# Patient Record
Sex: FEMALE | Race: OTHER | NOT HISPANIC OR LATINO | URBAN - METROPOLITAN AREA
[De-identification: names, ages, dates, MRNs, and addresses within clinical notes are randomized per-mention and may not be internally consistent; named-entity substitution may affect disease eponyms.]

---

## 2023-07-10 ENCOUNTER — EMERGENCY (EMERGENCY)
Facility: HOSPITAL | Age: 48
LOS: 1 days | Discharge: HOME | End: 2023-07-10
Attending: EMERGENCY MEDICINE
Payer: MEDICAID

## 2023-07-10 VITALS
TEMPERATURE: 98 F | SYSTOLIC BLOOD PRESSURE: 137 MMHG | OXYGEN SATURATION: 95 % | WEIGHT: 224.87 LBS | DIASTOLIC BLOOD PRESSURE: 86 MMHG | HEART RATE: 105 BPM | RESPIRATION RATE: 20 BRPM

## 2023-07-10 PROCEDURE — 99284 EMERGENCY DEPT VISIT MOD MDM: CPT

## 2023-07-10 PROCEDURE — 99283 EMERGENCY DEPT VISIT LOW MDM: CPT | Mod: 25

## 2023-07-10 PROCEDURE — 73030 X-RAY EXAM OF SHOULDER: CPT

## 2023-07-10 PROCEDURE — 73030 X-RAY EXAM OF SHOULDER: CPT | Mod: 26,RT

## 2023-07-10 NOTE — ED ADULT NURSE NOTE - CHIEF COMPLAINT QUOTE
Pt was able to be rescheduled as a VV for same day//dp  
R shoulder pain x 2 days, s/p fall 2 days ago

## 2023-07-10 NOTE — ED PROVIDER NOTE - CLINICAL SUMMARY MEDICAL DECISION MAKING FREE TEXT BOX
48-year-old female presents status post mechanical fall and right shoulder injury 3 days ago.  Well-appearing, vital signs stable, afebrile.  No focal neurodeficit on exam.  X-ray shows no signs of fracture or dislocation.  Symptoms may be related to ligamentous injury.  Sling applied and patient will need to follow-up with orthopedist outpatient within 1 week.

## 2023-07-10 NOTE — ED PROVIDER NOTE - NS ED ATTENDING STATEMENT MOD
This was a shared visit with the RICHIE. I reviewed and verified the documentation and independently performed the documented:

## 2023-07-10 NOTE — ED PROVIDER NOTE - PATIENT PORTAL LINK FT
You can access the FollowMyHealth Patient Portal offered by [facility] by registering at the following website: [email or web address]. By joining [employer]’s FollowMyHealth portal, you will also be able to view your health information using other applications (apps) compatible with our system.

## 2023-07-10 NOTE — ED PROVIDER NOTE - CARE PROVIDER_API CALL
Mele Ambrose.  Orthopaedic Surgery  [address], [address] [place] [address]  Phone: [phone]  Fax: [phone]  Follow Up Time:

## 2023-07-10 NOTE — ED PROVIDER NOTE - NSFOLLOWUPINSTRUCTIONS_ED_ALL_ED_FT
Follow up with the orthopedist within 1 week.  For pain you can take over the counter Ibuprofen 600 mg orally every 6 hours as needed for pain. Take medication with food.   If you experience any new or worsening symptoms or if you are concerned you can always come back to the emergency for a re-evaluation.  Some results may not be available at the time of your discharge from the hospital. You can download the FOLLOW MY HEALTH jayson and have access to these results.  If there were any prescriptions given to you during the visit today take them as prescribed. If you have any questions you can ask the pharmacist.

## 2023-07-10 NOTE — ED PROVIDER NOTE - OBJECTIVE STATEMENT
48-year-old female, no significant past medical history, presents for evaluation of right shoulder injury status post fall 3 days ago.  While standing on a chair tripped and fell forward unsure how she landed on the right side.  No head injury or LOC.  Since then reports localized pain and decreased range of motion.  No shooting pain down the arm, no numbness, tingling, focal weakness or any other complaints.  Denies any neck or back pain.  [place] with [place] improvement of pain.

## 2023-07-10 NOTE — ED PROVIDER NOTE - PHYSICAL EXAMINATION
Neck supple and full range of motion.  No spinal/paraspinal tenderness.  No clavicular or AC joint tenderness or deformity.  Localized anterior humeral head tenderness.  No ecchymosis.  Reduced range of motion especially with abduction.  Able to touch the opposite shoulder with passive range of motion.  Radial/ulnar pulses intact 2+ bilaterally.  Cap refill less than 2 seconds.

## 2023-07-11 NOTE — ED POST DISCHARGE NOTE - RESULT SUMMARY
Discussed with patient discrepancy of radiology report. Patient already with sling and has ortho outpatient follow up.